# Patient Record
Sex: FEMALE
[De-identification: names, ages, dates, MRNs, and addresses within clinical notes are randomized per-mention and may not be internally consistent; named-entity substitution may affect disease eponyms.]

---

## 2019-01-28 PROBLEM — Z00.00 ENCOUNTER FOR PREVENTIVE HEALTH EXAMINATION: Status: ACTIVE | Noted: 2019-01-28

## 2019-02-19 ENCOUNTER — APPOINTMENT (OUTPATIENT)
Dept: ORTHOPEDIC SURGERY | Facility: CLINIC | Age: 64
End: 2019-02-19
Payer: MEDICARE

## 2019-02-19 VITALS — DIASTOLIC BLOOD PRESSURE: 77 MMHG | SYSTOLIC BLOOD PRESSURE: 156 MMHG | HEART RATE: 58 BPM

## 2019-02-19 VITALS — BODY MASS INDEX: 19.26 KG/M2 | HEIGHT: 69 IN | WEIGHT: 130 LBS

## 2019-02-19 DIAGNOSIS — M17.0 BILATERAL PRIMARY OSTEOARTHRITIS OF KNEE: ICD-10-CM

## 2019-02-19 PROCEDURE — 73562 X-RAY EXAM OF KNEE 3: CPT | Mod: 50

## 2019-02-19 PROCEDURE — 99204 OFFICE O/P NEW MOD 45 MIN: CPT

## 2019-02-19 NOTE — DISCUSSION/SUMMARY
[Medication Risks Reviewed] : Medication risks reviewed [Surgical risks reviewed] : Surgical risks reviewed [de-identified] : 63 year old female presents with bilateral knee DJD. Discussed at length the natural history of degenerative arthritis and reviewed non-operative and operative treatment. At this point, she continues with mechanical symptoms and has failed nonoperative treatments. Considering the severity of the reported symptoms and inability to alleviate pain with conservative treatments, I suggest she have a bilateral TKA. However, in view of patient's vertebral osteomyelitis, I provided a referral for the patient to see a spine doctor first. After she meets for a consultation, we will further discuss operative treatment with bilateral TKA, which may need to be done at the new office in [facility]. Patient will follow up in 8 weeks.\par \par A knee replacement means a resurfacing of all three surfaces, the patella, femur, and tibia, with metal and plastic parts. The prosthetic parts are usually cemented into position and will allow a patient a range of motion from full extension to about 120 degrees of flexion. The postoperative motion, however, is determined by multiple factors the most important of which is preoperative motion. In general, the better the motion preoperatively, the better the motion post operatively.\par \par The operation, pending medical clearance, generally requires a hospitalization of 5 to 7 days for one knee (10 - 14 days for a bilateral replacement). In general, we prefer to perform the procedure under epidural anesthesia. The procedure takes approximately 2 hours to complete. The operation requires a straight incision anywhere from 6 to 9 inches down the front of the knee. Post operatively the patient is positioned on a continuous passive motion machine within the first 24 hours, and walking the day after surgery. The first couple of days are very painful and the pain medication will alleviate but not eliminate the pain. Thus the patient must really push hard to get the range of motion. Our goal for having the person go home is that the range of motion is approximately 90 degrees, and they can walk with a cane. The cane is to be dispensed with once the patient is secure enough. In general, there is no cast or braces required with a routine knee replacement.\par \par In the long term, we do not encourage our patients to run for the sake of running; although, pending their preoperative status, we often allow patients to play doubles tennis or comparable activities. We also allow them to do gentle intermediate downhill skiing if they are truly an expert skier. Biking is encouraged as well as swimming. The follow up periods are usually at 3 weeks, 6 weeks, 6 months, and yearly intervals.\par \par Potential complications with total knee replacements include infection (less than 1%), nerve damage, by which we mean a peroneal nerve palsy, a foot drop, (a flapping foot with ambulation). This is particularly more apt to occur with a bad valgus (knock knee) deformity. The incidence can be quite high in this particular patient population. There are always areas of skin numbness but this is not an untoward effect nor do we consider it a complication.\par \par Other potential complications include dislocation of the patella component (less than 2%). The loosening of either the tibial or femoral component is much more infrequent. It usually occurs with infection or long-term use in a patient population that is at extreme risk (e.g. markedly overweight and not conscientious about their exercises). Major blood vessel damage is also extremely rare. Theoretically because of the anatomic proximity of the popliteal artery, it could be lacerated with subsequent repairs required. Albeit unlikely, a disruption of the popliteal artery could theoretically result in an amputation.\par \par Similarly, infection could theoretically result in an amputation if one were to grow out an organism that cannot be controlled with antibiotics.\par \par General medical complications include phlebitis for which we prophylactically anticoagulated but it could still occur and fatal pulmonary embolism has been reported. Cardiovascular problems, such as a heart attack or ischemia are always a concern with such hemodynamic changes in the blood vascular system. Other general complications are very rare but anything in medicine could theoretically happen. I think the patient understands the risk benefit ratio of total knee replacement and will think about whether they would like to pursue an operative or nonoperative.

## 2019-02-19 NOTE — PHYSICAL EXAM
[LE] : Sensory: Intact in bilateral lower extremities [DP] : dorsalis pedis 2+ and symmetric bilaterally [PT] : posterior tibial 2+ and symmetric bilaterally [Antalgic] : antalgic [Cane] : ambulates with cane [Poor Appearance] : well-appearing [Acute Distress] : not in acute distress [Obese] : not obese [de-identified] : General appearance: Well nourished, well developed, pleasant, alert, and oriented x3.\par Respiratory: Breathing not labored, in no acute distress.\par HEENT: Normocephalic. EOM intact. Sclerae are clear.\par CV: No apparent abnormalities. No lower leg edema. No varicosities. Pedal pulses are palpable.\par Neurologic: Sensation is intact to light touch in the upper and lower extremities. \par Strength: No muscle weakness.\par Dermatologic: No apparent skin lesions or rash.\par Spine: C spine and L spine appear normal and move freely, normal and nontender.\par Upper Extremities: Hands, wrists, and elbows are normal and move freely. Shoulders are normal and move freely. All range of motion is symmetrical.\par Normal body habitus. Pulses are palpable.\par Review of systems, please see form for complete details. Medical data sheet was reviewed.\par \par Left knee: FROM hip, mild effusion, 0 - 115, no crepitus, no medial pain, no lateral pain, no Lachman, no pivot shift, no anterior drawer, no posterior drawer, stable, valgus alignment, 2+ instability with valgus stress\par Right knee: FROM hip, no effusion, 30 - 125, no crepitus, no medial pain, no lateral pain, no Lachman, no pivot shift, no anterior drawer, no posterior drawer, stable, valgus alignment, no instability\par   [de-identified] : X-rays, taken at the office today show:\par \par Right Knee x-rays:\par Standing AP, Lateral, and Merchant films:\par Moderate lateral DJD changes, grade 4 changes, sclerosis and osteophytes seen, severe patellar arthritis.\par \par Left Knee x-rays:\par Standing AP, Lateral, and Merchant films:\par Severe valgus DJD, grade 4 changes, sclerosis and osteophytes seen, mild patella arthritis.

## 2019-02-19 NOTE — ADDENDUM
[FreeTextEntry1] : Documented by Naveen Hernandez, solely acting as a scribe for Dr. Terrance Woodall on 02/19/2019.\par \par All medical record entries made by the Scribe were at my, Dr. Terrance Woodall, direction and personally dictated by me on 02/19/2019 . I have reviewed the chart and agree that the record accurately reflects my personal performance of the history, physical exam, assessment and plan. I have also personally directed, reviewed, and agreed with the chart.

## 2019-02-19 NOTE — HISTORY OF PRESENT ILLNESS
[de-identified] : 63 year old female presents to the office for an initial evaluation of ongoing bilateral knee pain for the past several years, left worse than right, worsening since 01/01/18. Patient rates pain as an 8/10 in severity, and describes it as constant in nature. She notes that pain is localized, and radiates. Pain is characterized as sharp, burning, and stabbing and is exacerbated by flexion at times. She notes that only rest helps to alleviate her pain. Patient has not yet seen another physician for this issue in the past. She has attempted treatment with NSAIDs, home exercise, a brace, and the use of a cane, which has only minimally helped to resolve her pain. Patient reports associated swelling, buckling, and clicking, but denies locking.

## 2019-04-18 ENCOUNTER — APPOINTMENT (OUTPATIENT)
Dept: ORTHOPEDIC SURGERY | Facility: CLINIC | Age: 64
End: 2019-04-18